# Patient Record
Sex: MALE | Race: BLACK OR AFRICAN AMERICAN | Employment: UNEMPLOYED | URBAN - METROPOLITAN AREA
[De-identification: names, ages, dates, MRNs, and addresses within clinical notes are randomized per-mention and may not be internally consistent; named-entity substitution may affect disease eponyms.]

---
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
## 2020-03-24 ENCOUNTER — OFFICE VISIT (OUTPATIENT)
Dept: NEUROLOGY | Age: 55
End: 2020-03-24

## 2020-03-24 VITALS
HEART RATE: 81 BPM | WEIGHT: 172 LBS | DIASTOLIC BLOOD PRESSURE: 80 MMHG | SYSTOLIC BLOOD PRESSURE: 124 MMHG | BODY MASS INDEX: 25.48 KG/M2 | HEIGHT: 69 IN | OXYGEN SATURATION: 98 %

## 2020-03-24 DIAGNOSIS — G60.9 IDIOPATHIC PERIPHERAL NEUROPATHY: Primary | ICD-10-CM

## 2020-03-24 RX ORDER — SIMETHICONE 125 MG
125 CAPSULE ORAL DAILY
COMMUNITY

## 2020-03-24 RX ORDER — DOXEPIN HYDROCHLORIDE 10 MG/1
CAPSULE ORAL
COMMUNITY
End: 2020-07-07

## 2020-03-24 RX ORDER — HYDROXYZINE 25 MG/1
TABLET, FILM COATED ORAL DAILY
COMMUNITY

## 2020-03-24 RX ORDER — EMTRICITABINE AND TENOFOVIR DISOPROXIL FUMARATE 200; 300 MG/1; MG/1
TABLET, FILM COATED ORAL DAILY
COMMUNITY

## 2020-03-24 RX ORDER — ESCITALOPRAM OXALATE 20 MG/1
20 TABLET ORAL DAILY
COMMUNITY

## 2020-03-24 RX ORDER — BUPROPION HYDROCHLORIDE 300 MG/1
300 TABLET ORAL
COMMUNITY

## 2020-03-24 RX ORDER — DOCUSATE SODIUM 100 MG/1
100 CAPSULE, LIQUID FILLED ORAL DAILY
COMMUNITY
End: 2020-07-07

## 2020-03-24 RX ORDER — METHOCARBAMOL 750 MG/1
TABLET, FILM COATED ORAL DAILY
COMMUNITY

## 2020-03-25 NOTE — PROGRESS NOTES
Hi,    All of you labs are not back yet, but that ones I have so far are normal.    Thanks.    Dr. Odette Bailey

## 2020-04-02 LAB
ACE SERPL-CCNC: 57 U/L (ref 14–82)
ALBUMIN SERPL ELPH-MCNC: 3.9 G/DL (ref 2.9–4.4)
ALBUMIN SERPL-MCNC: 4.3 G/DL (ref 3.8–4.9)
ALBUMIN/GLOB SERPL: 1.4 {RATIO} (ref 0.7–1.7)
ALBUMIN/GLOB SERPL: 1.7 {RATIO} (ref 1.2–2.2)
ALP SERPL-CCNC: 81 IU/L (ref 39–117)
ALPHA1 GLOB SERPL ELPH-MCNC: 0.2 G/DL (ref 0–0.4)
ALPHA2 GLOB SERPL ELPH-MCNC: 0.7 G/DL (ref 0.4–1)
ALT SERPL-CCNC: 14 IU/L (ref 0–44)
AST SERPL-CCNC: 19 IU/L (ref 0–40)
B-GLOBULIN SERPL ELPH-MCNC: 1 G/DL (ref 0.7–1.3)
BASOPHILS # BLD AUTO: 0 X10E3/UL (ref 0–0.2)
BASOPHILS NFR BLD AUTO: 0 %
BILIRUB SERPL-MCNC: 0.3 MG/DL (ref 0–1.2)
BUN SERPL-MCNC: 11 MG/DL (ref 6–24)
BUN/CREAT SERPL: 10 (ref 9–20)
CALCIUM SERPL-MCNC: 10 MG/DL (ref 8.7–10.2)
CENTROMERE B AB SER-ACNC: <0.2 AI (ref 0–0.9)
CHLORIDE SERPL-SCNC: 104 MMOL/L (ref 96–106)
CHROMATIN AB SERPL-ACNC: <0.2 AI (ref 0–0.9)
CO2 SERPL-SCNC: 27 MMOL/L (ref 20–29)
CREAT SERPL-MCNC: 1.12 MG/DL (ref 0.76–1.27)
DSDNA AB SER-ACNC: <1 IU/ML (ref 0–9)
ENA JO1 AB SER-ACNC: <0.2 AI (ref 0–0.9)
ENA RNP AB SER-ACNC: <0.2 AI (ref 0–0.9)
ENA SCL70 AB SER-ACNC: <0.2 AI (ref 0–0.9)
ENA SM AB SER-ACNC: <0.2 AI (ref 0–0.9)
ENA SM+RNP AB SER-ACNC: <0.2 AI (ref 0–0.9)
ENA SS-A AB SER-ACNC: <0.2 AI (ref 0–0.9)
ENA SS-B AB SER-ACNC: <0.2 AI (ref 0–0.9)
EOSINOPHIL # BLD AUTO: 0.2 X10E3/UL (ref 0–0.4)
EOSINOPHIL NFR BLD AUTO: 3 %
ERYTHROCYTE [DISTWIDTH] IN BLOOD BY AUTOMATED COUNT: 12.6 % (ref 11.6–15.4)
GAMMA GLOB SERPL ELPH-MCNC: 1.1 G/DL (ref 0.4–1.8)
GLOBULIN SER CALC-MCNC: 2.6 G/DL (ref 1.5–4.5)
GLOBULIN SER-MCNC: 3 G/DL (ref 2.2–3.9)
GLUCOSE SERPL-MCNC: 82 MG/DL (ref 65–99)
GM1 GANGL IGG TITR SER IA: 9 % (ref 0–30)
HCT VFR BLD AUTO: 45.8 % (ref 37.5–51)
HGB BLD-MCNC: 15.8 G/DL (ref 13–17.7)
IGA SERPL-MCNC: 299 MG/DL (ref 90–386)
IGG SERPL-MCNC: 1159 MG/DL (ref 700–1600)
IGM SERPL-MCNC: 73 MG/DL (ref 20–172)
IMM GRANULOCYTES # BLD AUTO: 0 X10E3/UL (ref 0–0.1)
IMM GRANULOCYTES NFR BLD AUTO: 0 %
INTERPRETATION SERPL IEP-IMP: NORMAL
INTERPRETATION,OLIG1: NORMAL
LYMPHOCYTES # BLD AUTO: 3.4 X10E3/UL (ref 0.7–3.1)
LYMPHOCYTES NFR BLD AUTO: 66 %
M PROTEIN SERPL ELPH-MCNC: NORMAL G/DL
MAG IGM AUTO-AB INTERPRETATION: NORMAL
MAG IGM SER-ACNC: <900 BTU (ref 0–999)
MCH RBC QN AUTO: 34.2 PG (ref 26.6–33)
MCHC RBC AUTO-ENTMCNC: 34.5 G/DL (ref 31.5–35.7)
MCV RBC AUTO: 99 FL (ref 79–97)
MONOCYTES # BLD AUTO: 0.5 X10E3/UL (ref 0.1–0.9)
MONOCYTES NFR BLD AUTO: 9 %
NEUTROPHILS # BLD AUTO: 1.1 X10E3/UL (ref 1.4–7)
NEUTROPHILS NFR BLD AUTO: 22 %
PLATELET # BLD AUTO: 206 X10E3/UL (ref 150–450)
PLEASE NOTE:, 149534: NORMAL
POTASSIUM SERPL-SCNC: 4.6 MMOL/L (ref 3.5–5.2)
PROT SERPL-MCNC: 6.9 G/DL (ref 6–8.5)
RBC # BLD AUTO: 4.62 X10E6/UL (ref 4.14–5.8)
RIBOSOMAL P AB SER-ACNC: <0.2 AI (ref 0–0.9)
SEE BELOW:, 164879: NORMAL
SODIUM SERPL-SCNC: 145 MMOL/L (ref 134–144)
T4 FREE SERPL-MCNC: 1.27 NG/DL (ref 0.82–1.77)
TSH SERPL DL<=0.005 MIU/L-ACNC: 2.75 UIU/ML (ref 0.45–4.5)
VIT B12 SERPL-MCNC: 469 PG/ML (ref 232–1245)
WBC # BLD AUTO: 5.1 X10E3/UL (ref 3.4–10.8)

## 2020-04-14 ENCOUNTER — DOCUMENTATION ONLY (OUTPATIENT)
Dept: NEUROLOGY | Age: 55
End: 2020-04-14

## 2020-06-17 ENCOUNTER — OFFICE VISIT (OUTPATIENT)
Dept: NEUROLOGY | Age: 55
End: 2020-06-17

## 2020-06-17 DIAGNOSIS — G56.01 CARPAL TUNNEL SYNDROME OF RIGHT WRIST: ICD-10-CM

## 2020-06-17 DIAGNOSIS — G12.20 MOTOR NEURON DISEASE (HCC): ICD-10-CM

## 2020-06-17 NOTE — PROCEDURES
ELECTRODIAGNOSTIC REPORT      Test Date:  2020    Patient: Kenyatta Alarcon : 1965 Physician: Dr. Ronnie Du D.O.   ID#: [id number] SEX: Male Ref. Phys:      Patient History / Exam:  This is a pleasant gentleman with progressive weakness in his upper extremity. His examination reveals sig weakness in his distal upper extremities with minimal sensory loss. He continues to have 2/5 strength in the distal right upper extremity and 3/5 on the left. Reflexes were 2+ in biceps/triceps and lower extremities. No pathological reflexes. EMG & NCV Findings:    Nerve conduction studies as listed below were normal for the bilateral ulnar and radial sensory nerves. This bilateral median sensory nerves revealed a prolongation of the peak latency with a reduced amplitude. The left median motor revealed a prolongation of the distal motor latency with a normal amplitude and conduction velocity. The right median motor revealed a prolongation of the distal motor latency with a significantly reduced amplitude. The right ulnar motor was absent. The left ulnar motor revealed a reduced amplitude with a normal conduction velocity. F waves were normal.     Disposable concentric needle examination of the muscles listed below revealed widespread active denervation with neurogenic appearing motor units throughout the right upper extremity. The right upper extremity had active denervation in the FDI only. Decreased recruitment was seen in the triceps and apb. The thoracic paraspinal muscles and mentalis were normal.      Impression: This study was abnormal.  There was electrodiagnostic evidence upon today's examination suggestin. A probable motor neuron disease/motor neuropathy involving the right upper extremity,and to a lesser extent the left upper extremity. A multilevel cervical radiculopathy would also be in the differential given these results.     2. A bilateral distal median sensory motor neuropathy across the wrist, as can be seen in a bilateral carpal tunnel syndrome. This however is not the primary neurological disease present. 3. There is no evidence of a demyelinating neuropathy or myopathy. 4. I discussed with the patient these results and that additional testing is needed. This will include serology and a lumbar puncture. The patient cannot receive a MRI. I placed the appropriate orders today and he will return to clinic immediately after the results are obtained.    ___________________________  Elinor CODY  FAAN    Nerve Conduction Studies  Anti Sensory Summary Table     Stim Site NR Onset (ms) Peak (ms) O-P Amp (µV) Norm Peak (ms) Norm O-P Amp Site1 Site2 Dist (cm) Norm Jose M (m/s)   Left Median Anti Sensory (2nd Digit)  32.9°C   Wrist    4.8 5.7 4.0 <4 >11 Wrist 2nd Digit 14.0    Right Median Anti Sensory (2nd Digit)  32.9°C   Wrist    3.6 4.6 9.9 <4 >11 Wrist 2nd Digit 14.0    Left Radial Anti Sensory (Base 1st Digit)  32.9°C   Wrist    1.6 2.2 48.2 <2.8 7 Wrist Base 1st Digit 10.0    Right Radial Anti Sensory (Base 1st Digit)  32.9°C   Wrist    1.8 2.6 47.0 <2.8 7 Wrist Base 1st Digit 10.0    Left Ulnar Anti Sensory (5th Digit)  32.9°C   Wrist    2.6 3.3 11.5 <4.0 >10 Wrist 5th Digit 14.0    Right Ulnar Anti Sensory (5th Digit)  32.9°C   Wrist    2.7 3.4 10.0 <4.0 >10 Wrist 5th Digit 14.0      Motor Summary Table     Stim Site NR Onset (ms) Norm Onset (ms) O-P Amp (mV) Norm O-P Amp P-T Amp (mV) Site1 Site2 Dist (cm) Jose M (m/s)   Left Median Motor (Abd Poll Brev)  32.9°C   Wrist    5.5 <4.5 6.1 >4.1  Wrist Abd Poll Brev 8.0 15   Elbow    10.1  5.8   Elbow Wrist 25.0 54   Right Median Motor (Abd Poll Brev)  32.9°C   Wrist    4.7 <4.5 0.6 >4.1  Wrist Abd Poll Brev 8.0 17   Elbow NR      Elbow Wrist 28.0    Left Ulnar Motor (Abd Dig Minimi)  32.9°C   Wrist    3.1 <3.1 4.7 >7.0  Wrist Abd Dig Minimi 8.0 26   B Elbow    7.3  4.4   B Elbow Wrist 22.0 52   A Elbow    9.3  4.3   A Elbow B Elbow 10.0 50   Right Ulnar Motor (Abd Dig Minimi)  32.9°C   Wrist NR  <3.1  >7.0  Wrist Abd Dig Minimi 8.0    B Elbow NR      B Elbow Wrist 0.0    A Elbow NR      A Elbow B Elbow 10.0      F Wave Studies     NR F-Lat (ms) Lat Norm (ms) L-R F-Lat (ms) L-R Lat Norm   Left Median (Mrkrs) (Abd Poll Brev)  32.9°C      28.83 <31 1.77 <2.2   Right Median (Mrkrs) (Abd Poll Brev)  32.9°C      27.06 <31 1.77 <2.2       EMG     Side Muscle Nerve Root Ins Act Fibs Psw Recrt Duration Amp Poly Comment   Right Deltoid Axillary C5-6 Incr 2+ 2+ Reduced Nml Nml Nml    Right Triceps Radial C6-7-8 Incr 2+ 1+ Reduced Incr Nml Nml    Right Biceps Musculocut C5-6 Incr 2+ 1+ Reduced Incr Nml Nml    Right 1stDorInt Ulnar C8-T1 Incr 2+ 1+ Reduced Nml Nml 1+    Right Abd Poll Brev Median C8-T1 Incr 2+ 1+ Nml Nml Nml Nml    Left Deltoid Axillary C5-6 Nml Nml Nml Nml Nml Nml Nml    Left Triceps Radial C6-7-8 Nml Nml Nml Reduced Nml Nml Nml    Left Biceps Musculocut C5-6 Nml Nml Nml Nml Nml Nml Nml    Left 1stDorInt Ulnar C8-T1 Incr 2+ 2+ Reduced Nml Nml Nml    Left Abd Poll Brev Median C8-T1 Nml Nml Nml Reduced Nml Incr Nml    Left lLower Thoracic  T10T11 Nml Nml Nml Nml Nml Nml Nml    Left mentalis Facial CN VII Nml Nml Nml Nml Nml Nml Nml      Waveforms:

## 2020-06-22 ENCOUNTER — TELEPHONE (OUTPATIENT)
Dept: NEUROLOGY | Age: 55
End: 2020-06-22

## 2020-06-24 ENCOUNTER — TELEPHONE (OUTPATIENT)
Dept: NEUROLOGY | Age: 55
End: 2020-06-24

## 2020-06-26 ENCOUNTER — DOCUMENTATION ONLY (OUTPATIENT)
Dept: NEUROLOGY | Age: 55
End: 2020-06-26

## 2020-06-29 ENCOUNTER — TELEPHONE (OUTPATIENT)
Dept: NEUROLOGY | Age: 55
End: 2020-06-29

## 2020-07-05 DIAGNOSIS — G12.20 MOTOR NEURON DISEASE (HCC): Primary | ICD-10-CM

## 2020-07-07 NOTE — PROGRESS NOTES
PT pre call done. PT aware of need to hold anticoagulants per protocol. PT aware of need for  at discharge. PT aware of arrival time pre procedure, 0745 for head CT at 0800. Pt states no questions at this time. His LP is for 1000.

## 2020-07-15 ENCOUNTER — HOSPITAL ENCOUNTER (OUTPATIENT)
Dept: CT IMAGING | Age: 55
Discharge: HOME | End: 2020-07-15
Payer: MEDICAID

## 2020-07-15 ENCOUNTER — HOSPITAL ENCOUNTER (OUTPATIENT)
Dept: GENERAL RADIOLOGY | Age: 55
Discharge: HOME | End: 2020-07-15
Attending: RADIOLOGY | Admitting: RADIOLOGY
Payer: MEDICAID

## 2020-07-15 VITALS
SYSTOLIC BLOOD PRESSURE: 100 MMHG | DIASTOLIC BLOOD PRESSURE: 69 MMHG | OXYGEN SATURATION: 100 % | TEMPERATURE: 97.7 F | RESPIRATION RATE: 20 BRPM | HEIGHT: 69 IN | BODY MASS INDEX: 27.22 KG/M2 | WEIGHT: 183.8 LBS | HEART RATE: 68 BPM

## 2020-07-15 DIAGNOSIS — G12.20 MOTOR NEURON DISEASE (HCC): ICD-10-CM

## 2020-07-15 LAB
ALBUMIN SERPL-MCNC: 4.1 G/DL (ref 3.4–5)
ALBUMIN/GLOB SERPL: 1.3 {RATIO} (ref 0.8–1.7)
ALP SERPL-CCNC: 88 U/L (ref 45–117)
ALT SERPL-CCNC: 23 U/L (ref 16–61)
ANION GAP SERPL CALC-SCNC: 3 MMOL/L (ref 3–18)
APPEARANCE CSF: ABNORMAL
AST SERPL-CCNC: 17 U/L (ref 10–38)
BASOPHILS # BLD: 0 K/UL (ref 0–0.1)
BASOPHILS NFR BLD: 0 % (ref 0–2)
BILIRUB SERPL-MCNC: 1.3 MG/DL (ref 0.2–1)
BUN SERPL-MCNC: 13 MG/DL (ref 7–18)
BUN/CREAT SERPL: 12 (ref 12–20)
CALCIUM SERPL-MCNC: 9.3 MG/DL (ref 8.5–10.1)
CHLORIDE SERPL-SCNC: 105 MMOL/L (ref 100–111)
CO2 SERPL-SCNC: 32 MMOL/L (ref 21–32)
COLOR CSF: ABNORMAL
CREAT SERPL-MCNC: 1.13 MG/DL (ref 0.6–1.3)
DIFFERENTIAL METHOD BLD: ABNORMAL
EOSINOPHIL # BLD: 0.1 K/UL (ref 0–0.4)
EOSINOPHIL NFR BLD: 1 % (ref 0–5)
ERYTHROCYTE [DISTWIDTH] IN BLOOD BY AUTOMATED COUNT: 12.9 % (ref 11.6–14.5)
GLOBULIN SER CALC-MCNC: 3.2 G/DL (ref 2–4)
GLUCOSE CSF-MCNC: 54 MG/DL (ref 40–70)
GLUCOSE SERPL-MCNC: 120 MG/DL (ref 74–99)
HCT VFR BLD AUTO: 47.9 % (ref 36–48)
HGB BLD-MCNC: 15.9 G/DL (ref 13–16)
LYMPHOCYTES # BLD: 3.1 K/UL (ref 0.9–3.6)
LYMPHOCYTES NFR BLD: 49 % (ref 21–52)
MCH RBC QN AUTO: 33.8 PG (ref 24–34)
MCHC RBC AUTO-ENTMCNC: 33.2 G/DL (ref 31–37)
MCV RBC AUTO: 101.7 FL (ref 74–97)
MONOCYTES # BLD: 0.5 K/UL (ref 0.05–1.2)
MONOCYTES NFR BLD: 9 % (ref 3–10)
NEUTS SEG # BLD: 2.6 K/UL (ref 1.8–8)
NEUTS SEG NFR BLD: 41 % (ref 40–73)
PLATELET # BLD AUTO: 173 K/UL (ref 135–420)
PMV BLD AUTO: 11.2 FL (ref 9.2–11.8)
POTASSIUM SERPL-SCNC: 4.6 MMOL/L (ref 3.5–5.5)
PROT CSF-MCNC: 74 MG/DL (ref 15–45)
PROT SERPL-MCNC: 7.3 G/DL (ref 6.4–8.2)
RBC # BLD AUTO: 4.71 M/UL (ref 4.7–5.5)
RBC # CSF: 7175 /CU MM
SODIUM SERPL-SCNC: 140 MMOL/L (ref 136–145)
T4 FREE SERPL-MCNC: 1.3 NG/DL (ref 0.7–1.5)
TSH SERPL DL<=0.05 MIU/L-ACNC: 0.94 UIU/ML (ref 0.36–3.74)
TUBE # CSF: 1
TUBE # CSF: 2
TUBE # CSF: 4
VIT B12 SERPL-MCNC: 380 PG/ML (ref 211–911)
WBC # BLD AUTO: 6.3 K/UL (ref 4.6–13.2)
WBC # CSF: 9 /CU MM

## 2020-07-15 PROCEDURE — 89050 BODY FLUID CELL COUNT: CPT

## 2020-07-15 PROCEDURE — 82607 VITAMIN B-12: CPT

## 2020-07-15 PROCEDURE — 82945 GLUCOSE OTHER FLUID: CPT

## 2020-07-15 PROCEDURE — 84439 ASSAY OF FREE THYROXINE: CPT

## 2020-07-15 PROCEDURE — 36415 COLL VENOUS BLD VENIPUNCTURE: CPT

## 2020-07-15 PROCEDURE — 85025 COMPLETE CBC W/AUTO DIFF WBC: CPT

## 2020-07-15 PROCEDURE — 84443 ASSAY THYROID STIM HORMONE: CPT

## 2020-07-15 PROCEDURE — 82784 ASSAY IGA/IGD/IGG/IGM EACH: CPT

## 2020-07-15 PROCEDURE — 62270 DX LMBR SPI PNXR: CPT

## 2020-07-15 PROCEDURE — 86592 SYPHILIS TEST NON-TREP QUAL: CPT

## 2020-07-15 PROCEDURE — 84157 ASSAY OF PROTEIN OTHER: CPT

## 2020-07-15 PROCEDURE — 70450 CT HEAD/BRAIN W/O DYE: CPT

## 2020-07-15 PROCEDURE — 82164 ANGIOTENSIN I ENZYME TEST: CPT

## 2020-07-15 PROCEDURE — 80053 COMPREHEN METABOLIC PANEL: CPT

## 2020-07-15 PROCEDURE — 86617 LYME DISEASE ANTIBODY: CPT

## 2020-07-15 PROCEDURE — 83520 IMMUNOASSAY QUANT NOS NONAB: CPT

## 2020-07-15 RX ORDER — ACETAMINOPHEN 500 MG
1000 TABLET ORAL
COMMUNITY

## 2020-07-15 RX ORDER — LIDOCAINE HYDROCHLORIDE 10 MG/ML
1-10 INJECTION, SOLUTION EPIDURAL; INFILTRATION; INTRACAUDAL; PERINEURAL
Status: COMPLETED | OUTPATIENT
Start: 2020-07-15 | End: 2020-07-15

## 2020-07-15 RX ORDER — PANTOPRAZOLE SODIUM 40 MG/1
40 TABLET, DELAYED RELEASE ORAL DAILY
COMMUNITY

## 2020-07-15 RX ORDER — LIDOCAINE HYDROCHLORIDE 10 MG/ML
INJECTION, SOLUTION EPIDURAL; INFILTRATION; INTRACAUDAL; PERINEURAL
Status: DISCONTINUED
Start: 2020-07-15 | End: 2020-07-15 | Stop reason: HOSPADM

## 2020-07-15 RX ORDER — HYDROCODONE BITARTRATE AND ACETAMINOPHEN 5; 325 MG/1; MG/1
1 TABLET ORAL
Status: DISCONTINUED | OUTPATIENT
Start: 2020-07-15 | End: 2020-07-15 | Stop reason: HOSPADM

## 2020-07-15 RX ADMIN — LIDOCAINE HYDROCHLORIDE 2 ML: 10 INJECTION, SOLUTION EPIDURAL; INFILTRATION; INTRACAUDAL; PERINEURAL at 10:57

## 2020-07-15 NOTE — H&P
The patient is an appropriate candidate to undergo LP. Patient assessed immediately prior to induction. Anesthesia plan as follows: Local/No Anesthesia. History and Physical update:  H&P was reviewed and the patient was examined. No changes have occurred in the patient's condition since the H&P was completed.     Bobby Jones MD  Vascular & Interventional Radiology  [facility]  7/15/2020

## 2020-07-15 NOTE — PROCEDURES
Vascular & Interventional Radiology Brief Procedure Note    Interventional Radiologist: Rosaline Leon MD    Pre-operative Diagnosis:  Upper ext weakness    Post-operative Diagnosis: Same as pre-op dx    Procedure(s) Performed:  LP    Anesthesia:  Local and Moderate Sedation    Findings:  Opening pressure 14cm H2O.  13ccs of clear blood tinged CSF.       Complications: None    Estimated Blood Loss:  minimal    Tubes and Drains: None    Specimens: sent    Condition: Good       Rosaline Leon MD  [address] [place] [address],[facility]  Vascular & Interventional Radiology  7/15/2020

## 2020-07-15 NOTE — PROGRESS NOTES
Pt is all prepped and ready for procedure. 1037 Pt back to care unit. Awake and alert and tolerated procedure well. Puncture site to back dry and intact. 7000 Discharge instructions reviewed with pt and family and they verbalized all understandings. [phone] Pt escorted to car and left with family in stable condition.

## 2020-07-15 NOTE — DISCHARGE INSTRUCTIONS
Patient Education     Lumbar Puncture: After Your Visit  Your Care Instructions  A lumbar puncture (also called a spinal tap) is a test to check the fluid that surrounds and protects your spinal cord and brain. Your doctor may have done this test to look for an infection. In some cases, a lumbar puncture is done to release pressure from too much fluid or to look for diseases such as multiple sclerosis. The fluid that was taken is often sent to a lab for different tests. Your doctor may get some answers right away, but other answers take hours to days. Your doctor will call you with the results. You may feel tired or have a mild backache or a headache after the test. Some people have trouble sleeping for 1 or 2 days. Follow-up care is a key part of your treatment and safety. Be sure to make and go to all appointments, and call your doctor if you are having problems. Its also a good idea to know your test results and keep a list of the medicines you take. How can you care for yourself at home? · Drink plenty of liquids in the next few hours. This may prevent a headache or keep a headache from being severe. · Your doctor may tell you to lie flat in bed for 1 to 4 hours. This may prevent a headache. · Get plenty of rest.  · If your doctor prescribed antibiotics, take them as directed. Do not stop taking them just because you feel better. You need to take the full course of antibiotics. · Take anti-inflammatory medicines to reduce a headache or backache. These include ibuprofen (Advil, Motrin) and naproxen (Aleve). Read and follow all instructions on the label. When should you call for help? Call your doctor now or seek immediate medical care if:  · You have a fever with a stiff neck or a severe headache. · You have any drainage or bleeding from the site of the puncture. · You feel numb or lose strength below the puncture site.   Watch closely for changes in your health, and be sure to contact your doctor if:  · You do not get better as expected. Where can you learn more? Go to Me[employer]be  Enter B775 in the search box to learn more about \"Lumbar Puncture: After Your Visit. \"   © 7344-5357 [employer]wise, Incorporated. Care instructions adapted under license by [facility] [employer] (which disclaims liability or warranty for this information). This care instruction is for use with your licensed healthcare professional. If you have questions about a medical condition or this instruction, always ask your healthcare professional. [place] [address] any warranty or liability for your use of this information. Content Version: 7.0.844356; Last Revised: September 13, 2011                 DISCHARGE SUMMARY from Nurse    PATIENT INSTRUCTIONS:    After general anesthesia or intravenous sedation, for 24 hours or while taking prescription Narcotics:  · Limit your activities  · Do not drive and operate hazardous machinery  · Do not make important personal or business decisions  · Do  not drink alcoholic beverages  · If you have not urinated within 8 hours after discharge, please contact your surgeon on call. Report the following to your surgeon:  · Excessive pain, swelling, redness or odor of or around the surgical area  · Temperature over 100.5  · Nausea and vomiting lasting longer than 4 hours or if unable to take medications  · Any signs of decreased circulation or nerve impairment to extremity: change in color, persistent  numbness, tingling, coldness or increase pain  · Any questions    What to do at Home:  Recommended activity: Activity as tolerated,        *  Please give a list of your current medications to your Primary Care Provider. *  Please update this list whenever your medications are discontinued, doses are      changed, or new medications (including over-the-counter products) are added.     *  Please carry medication information at all times in case of emergency situations. These are general instructions for a healthy lifestyle:    No smoking/ No tobacco products/ Avoid exposure to second hand smoke  Surgeon General's Warning:  Quitting smoking now greatly reduces serious risk to your health. Obesity, smoking, and sedentary lifestyle greatly increases your risk for illness    A healthy diet, regular physical exercise & weight monitoring are important for maintaining a healthy lifestyle    You may be retaining fluid if you have a history of heart failure or if you experience any of the following symptoms:  Weight gain of 3 pounds or more overnight or 5 pounds in a week, increased swelling in our hands or feet or shortness of breath while lying flat in bed. Please call your doctor as soon as you notice any of these symptoms; do not wait until your next office visit. The discharge information has been reviewed with the patient and caregiver. The patient and caregiver verbalized understanding. Discharge medications reviewed with the patient and caregiver and appropriate educational materials and side effects teaching were provided.     Patient armband removed and shredded    ___________________________________________________________________________________________________________________________________

## 2020-07-16 LAB
ACE SERPL-CCNC: 5 U/L (ref 14–82)
ANGIO CONVERTING ENZ, CSF: <1.5 U/L (ref 0–3.1)
MAG AB, IGM, ANMGLT: <900 BTU (ref 0–999)
MAG IGM AUTO-AB INTERPRETATION: NORMAL
REAGIN AB CSF QL: NON REACTIVE

## 2020-07-17 LAB
IGA SERPL-MCNC: 209 MG/DL (ref 90–386)
IGG SERPL-MCNC: 1023 MG/DL (ref 603–1613)
IGM SERPL-MCNC: 108 MG/DL (ref 20–172)
PROT PATTERN SERPL IFE-IMP: NORMAL

## 2020-07-17 NOTE — PROGRESS NOTES
Neurology Note    Patient ID:  Sonny Rousseau  [id number]  2 y.o.  1965      Date of Consultation:  July 21, 2020    Referring Physician: Dr. Satya Estes    Reason for Consultation:  weakness    Subjective: I am still weak       History of Present Illness:   Sonny Rousseau is a 48 y.o. male who returns to the neurology clinic at [facility] for an evaluation of his upper extremity weakness. I did last see the patient approximately 1 month ago and he did have his EMG/nerve conduction study performed. 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 A multiple level cervical radiculopathy could also give similar results. There was a mild bilateral carpal tunnel syndrome but this was not his primary abnormality. After that visit, he did have a lumbar puncture performed. It was a traumatic tap with over 7000 red blood cells. This accounted for the slightly elevated white blood cell count and the protein level. Anti-mag antibody from serum was negative. ACE level was negative    New low back pain. Gets sore intermittently. He has also noticed that he is getting a bit more of a sore throat. Swallowing is okay occasionally he feels that it is getting stuck. He tolerated the lumbar puncture without any difficulty. He has not noticed any new weakness in his hands.       Past Medical History:   Diagnosis Date    Arrhythmia     Depression     GERD (gastroesophageal reflux disease)     Ill-defined condition     possible [facility] dx    Neurological disorder         Past Surgical History:   Procedure Laterality Date    HX APPENDECTOMY      NEUROLOGICAL PROCEDURE UNLISTED      rods/screws in neck        Family History   Problem Relation Age of Onset    No Known Problems Mother         Social History     Tobacco Use    Smoking status: Current Every Day Smoker     Packs/day: 0.50    Smokeless tobacco: Never Used   Substance Use Topics    Alcohol use: Not Currently        No Known Allergies     Prior to Admission medications    Medication Sig Start Date End Date Taking? Authorizing Provider   gabapentin (NEURONTIN) 300 mg capsule Take 1 Cap by mouth three (3) times daily. Max Daily Amount: 900 mg. 7/21/20  Yes Gene Lyles DO   pantoprazole (Protonix) 40 mg tablet Take 40 mg by mouth daily. Yes Provider, Historical   acetaminophen (Tylenol Extra Strength) 500 mg tablet Take 1,000 mg by mouth every six (6) hours as needed for Pain. Yes Provider, Historical   buPROPion XL (WELLBUTRIN XL) 300 mg XL tablet Take 300 mg by mouth every morning. Yes Provider, Historical   escitalopram oxalate (LEXAPRO) 20 mg tablet Take 20 mg by mouth daily. Yes Provider, Historical   simethicone (GAS-X) 125 mg capsule Take 125 mg by mouth daily. Yes Provider, Historical   emtricitabine-tenofovir, TDF, (Truvada) 200-300 mg per tablet Take  by mouth daily. Yes Provider, Historical   hydrOXYzine HCL (ATARAX) 25 mg tablet Take  by mouth daily. Yes Provider, Historical   methocarbamoL (ROBAXIN) 750 mg tablet Take  by mouth daily.    Yes Provider, Historical       Review of Systems:    General, constitutional: negative  Eyes, vision: negative  Ears, nose, throat: negative  Cardiovascular, heart: negative  Respiratory: negative  Gastrointestinal: negative  Genitourinary: negative  Musculoskeletal: negative  Skin and integumentary: negative  Psychiatric: negative  Endocrine: negative  Neurological: negative, except for HPI  Hematologic/lymphatic: negative  Allergy/immunology: negative    Objective:     Visit Vitals  /80   Pulse 78   Ht 5' 9\" (1.753 m)   SpO2 98%   BMI 27.14 kg/m²       Physical Exam:    General:  appears well nourished in no acute distress  Neck: no carotid bruits  Lungs: clear to auscultation  Heart:  no murmurs, regular rate  Lower extremity: peripheral pulses palpable and no edema  Skin: intact    Neurological exam:    Awake, alert, oriented to person, place and time  Recent and remote memory were normal  Attention and concentration were intact  Language was intact. There was no aphasia  Speech: no dysarthria  Fund of knowledge was preserved    Cranial nerves:   II-XII were tested    Perrrla  Visual fields were full  Eomi, no evidence of nystagmus  Facial sensation:  normal and symmetric  Facial motor: normal and symmetric  Hearing intact  SCM strength intact  Tongue: midline without fasciculations    Motor: Tone normal    No evidence of fasciculations    Strength testing:   deltoid triceps biceps Wrist ext. Wrist flex. intrinsics Hip flex. Hip ext. Knee ext. Knee flex Dorsi flex Plantar flex   Right 5 5 5 4 4 2 5 5 5 5 5 5   Left 5 5 5 4 4 3 5 5 5 5 5 5         Sensory:  Upper extremity: intact to pp, light touch, and vibration  Lower extremity: intact to pp, light touch, and vibration    Reflexes:    Right Left  Biceps  2 2  Triceps 1 1  Brachiorad. 2 2  Patella  2 2  Achilles 2 2    There are no pathologic reflexes    Plantar response:  flexor bilaterally    Cerebellar testing:  no tremor apparent, finger/nose and hannah were intact    Romberg: absent    Gait: steady. Heel, toe, and tandem gait were normal    Labs:     Lab Results   Component Value Date/Time    Sodium 140 07/15/2020 11:40 AM    Potassium 4.6 07/15/2020 11:40 AM    Chloride 105 07/15/2020 11:40 AM    Glucose 120 (H) 07/15/2020 11:40 AM    BUN 13 07/15/2020 11:40 AM    Creatinine 1.13 07/15/2020 11:40 AM    Calcium 9.3 07/15/2020 11:40 AM    WBC 6.3 07/15/2020 11:40 AM    HCT 47.9 07/15/2020 11:40 AM    HGB 15.9 07/15/2020 11:40 AM    PLATELET 346 54/31/6672 11:40 AM       Imaging:    No results found for this or any previous visit. Results from [place] encounter on 07/15/20   CT HEAD WO CONT    Narrative EXAM: CT HEAD WITHOUT CONTRAST    CLINICAL INDICATION/HISTORY: asymmetric weakness.   needs before lumbar puncture  -Additional: There is reports asymmetric weakness beginning April 2019, history  of cervical spine surgery for disc disease related to spinal stenosis,  progression of weakness in both hands, question ALS. COMPARISON: None    TECHNIQUE: Serial axial images of the head were performed without intravenous  contrast. Dose reduction techniques:  Automated exposure control, mAs and/or kVp  reductions based on patient size, and iterative reconstruction. The specific  techniques utilized on this CT exam have been documented in the patient's  electronic medical record. Digital Imaging and Communications in Medicine  (DICOM) format image data are available to nonaffiliated external healthcare  facilities or entities on a secure, media free, reciprocally searchable basis  with patient authorization for at least a 12-month period after this study. _______________    FINDINGS:    BRAIN:     > Intraparenchymal hemorrhage: None.     > Mass effect/edema: None.     > Infarct/encephalomalacia: No evidence of acute cortical ischemia.    > White matter: Unremarkable.     > Brain volume: Normal for age. EXTRA-AXIAL SPACES:     > No extra-axial hemorrhage.     > No hydrocephalus. SINUSES/MASTOIDS: Clear. CALVARIA: Intact. OTHER: Asymmetric soft tissue near the vertex (sagittal image 19), possible soft  tissue contusion. _______________      Impression IMPRESSION:    No acute intracranial findings. Please note MRI would be best able evaluate if  there is concern for ALS. Assessment and Plan:    The patient is a pleasant 61-year-old gentleman who has severe weakness in his hands bilaterally right greater than left. His neurological examination does reveal atrophy and weakness in his bilateral upper extremities right greater than left. Bilateral hand weakness:  His EMG reveals a motor neuropathy versus cervical radiculopathy in the low cervical level.   He did have a prior cervical spine surgery. All of his laboratory results from his spinal tap are not returned yet we are still waiting for those results. I did discuss this with him. He does have profound hand weakness which does impact his ability to have meaningful hand functioning. I discussed with him the differential of being residual from his cervical spine surgery and just the wallerian degeneration of the nerves versus a focal motor motor neuronopathy, the beginnings of a widespread motor neuronopathy,  or an autoimmune motor nerve disease which are we are waiting for those results. Given the neuropathic pain that he is having, I will start him back on gabapentin 600 mg 3 times a day. Side effects of the medication were reviewed with him in detail. Pending the results of the CSF, additional testing may need to be considered. All of his questions were answered fully today. There is no problem list on file for this patient. The patient should return to clinic in 3 months    Renewed medication: yes.  Side effects reviewed                 Signed By:  Nigel Alpers, DO FAAN    July 21, 2020

## 2020-07-21 ENCOUNTER — OFFICE VISIT (OUTPATIENT)
Dept: NEUROLOGY | Age: 55
End: 2020-07-21

## 2020-07-21 VITALS
DIASTOLIC BLOOD PRESSURE: 80 MMHG | HEIGHT: 69 IN | OXYGEN SATURATION: 98 % | HEART RATE: 78 BPM | SYSTOLIC BLOOD PRESSURE: 128 MMHG | BODY MASS INDEX: 27.14 KG/M2

## 2020-07-21 DIAGNOSIS — G60.9 IDIOPATHIC PERIPHERAL NEUROPATHY: Primary | ICD-10-CM

## 2020-07-21 RX ORDER — GABAPENTIN 300 MG/1
300 CAPSULE ORAL 3 TIMES DAILY
Qty: 90 CAP | Refills: 5 | Status: SHIPPED | OUTPATIENT
Start: 2020-07-21 | End: 2021-07-03

## 2020-07-21 NOTE — LETTER
7/21/2020 12:29 PM 
 
Mr. Elyse Herrera [address] [address] This patient was seen in our office today with Dr. Frannie Rivera Sincerely, Gene Lyles, DO

## 2020-07-22 LAB
B BURGDOR IGG PATRN CSF IB-IMP: NEGATIVE
B BURGDOR IGM PATRN CSF IB-IMP: NEGATIVE
B BURGDOR18KD IGG CSF QL IB: PRESENT
B BURGDOR23KD IGG CSF QL IB: ABNORMAL
B BURGDOR23KD IGM CSF QL IB: ABNORMAL
B BURGDOR28KD IGG CSF QL IB: ABNORMAL
B BURGDOR30KD IGG CSF QL IB: ABNORMAL
B BURGDOR39KD IGG CSF QL IB: ABNORMAL
B BURGDOR39KD IGM CSF QL IB: ABNORMAL
B BURGDOR41KD IGG CSF QL IB: ABNORMAL
B BURGDOR41KD IGM CSF QL IB: ABNORMAL
B BURGDOR45KD IGG CSF QL IB: ABNORMAL
B BURGDOR58KD IGG CSF QL IB: ABNORMAL
B BURGDOR66KD IGG CSF QL IB: ABNORMAL
B BURGDOR93KD IGG CSF QL IB: ABNORMAL

## 2020-07-23 LAB
GM1 IGG AUTOANTIBODIES, GM1GAT: 9 % (ref 0–30)
INTERPRETATION, RGM1GT: NORMAL

## 2020-08-13 ENCOUNTER — TELEPHONE (OUTPATIENT)
Dept: NEUROLOGY | Age: 55
End: 2020-08-13

## 2020-08-17 ENCOUNTER — VIRTUAL VISIT (OUTPATIENT)
Dept: NEUROLOGY | Age: 55
End: 2020-08-17
Payer: MEDICAID

## 2020-08-17 DIAGNOSIS — G12.20 MOTOR NEURON DISEASE (HCC): Primary | ICD-10-CM

## 2020-08-17 PROCEDURE — 99214 OFFICE O/P EST MOD 30 MIN: CPT | Performed by: PSYCHIATRY & NEUROLOGY
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
## 2020-08-17 NOTE — PATIENT INSTRUCTIONS

## 2020-12-11 ENCOUNTER — OFFICE VISIT (OUTPATIENT)
Dept: NEUROLOGY | Age: 55
End: 2020-12-11
Payer: MEDICAID

## 2020-12-11 DIAGNOSIS — G56.03 BILATERAL CARPAL TUNNEL SYNDROME: ICD-10-CM

## 2020-12-11 DIAGNOSIS — M54.12 CERVICAL RADICULOPATHY: ICD-10-CM

## 2020-12-11 PROCEDURE — 95887 MUSC TST DONE W/N TST NONEXT: CPT | Performed by: PSYCHIATRY & NEUROLOGY

## 2020-12-11 PROCEDURE — 95885 MUSC TST DONE W/NERV TST LIM: CPT | Performed by: PSYCHIATRY & NEUROLOGY

## 2020-12-11 PROCEDURE — 95886 MUSC TEST DONE W/N TEST COMP: CPT | Performed by: PSYCHIATRY & NEUROLOGY

## 2020-12-11 PROCEDURE — 95913 NRV CNDJ TEST 13/> STUDIES: CPT | Performed by: PSYCHIATRY & NEUROLOGY

## 2020-12-11 NOTE — PROCEDURES
ELECTRODIAGNOSTIC REPORT      Test Date:  2020    Patient: Chris Bautista : 1965 Physician: Dr. Naeem Butt   ID#: [id number] SEX: Male Ref. Phys: Dr. Naeem Butt     Patient History / Exam:  this is a pleasant 79-year-old gentleman who presents for a follow-up electrodiagnostic study due to progressive hand weakness. His examination today reveals 5 out of 5 strength in his deltoid, biceps, and triceps. His wrist extensors were 5 out of 5, wrist flexors 4 out of 5. Hand intrinsics were 2 out of 5, worse on the right than left. Reflexes were 2 in the biceps and 1 triceps and brachioradialis. EMG & NCV Findings:  Nerve conduction studies as listed below were normal for the right superficial fibular sensory, sural sensory, right radial sensory, left radial sensory, left ulnar sensory, and right ulnar sensory. The left median sensory was absent. The right median sensory revealed a mild prolongation of the peak latency with a slightly reduced amplitude    The right fibular motor was normal.  The left median motor revealed a mild prolongation of the distal motor latency with a normal amplitude. The right median motor revealed a mild prolongation of the peak latency with a markedly reduced amplitude throughout and slowing of the conduction velocity. The right tibial motor was normal.  The left ulnar motor revealed a reduced amplitude throughout. The right ulnar motor nerve conduction study was absent    Disposable concentric needle examination of the bilateral upper extremities revealed active denervation in the left FDI and APB. He was also noted to a mild degree in the right deltoid, triceps, and biceps. There was neurogenic appearing motor units throughout both upper extremities with large amplitude decreased recruitment and polyphasia. The thoracic paraspinal musculature and right lower extremity were normal.    Impression:     This study was abnormal.  There is electrodiagnostic evidence upon today's examination suggestin. A probable multilevel bilateral cervical radiculopathy given the decrease in active denervation from the prior study 6 months ago with more chronic neurogenic appearing motor units. Given the changes over the last 6 months, it would be now less likely to be a motor neuron disease. There was no progression in the denervation from the prior study. 2.  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 This again however is not a primary neurological disease present. 3.  There is no evidence of a demyelinating neuropathy or a myopathy. I discussed these results at length with the patient today. I will prescribe gabapentin 600 mg 3 times a day to help with his neuropathic pain he will continue to follow very closely in neurology clinic. A follow-up study may need to be considered pending his progression. ___________________________  Angie CODY   FAAN        Nerve Conduction Studies  Anti Sensory Summary Table     Stim Site NR Onset (ms) Peak (ms) O-P Amp (µV) Norm Peak (ms) Norm O-P Amp Site1 Site2 Dist (cm) Norm Jose M (m/s)   Left Median Anti Sensory (2nd Digit)  34.6°C   Wrist NR    <4 >11 Wrist 2nd Digit 14.0    Right Median Anti Sensory (2nd Digit)  34.6°C   Wrist    3.4 4.1 9.4 <4 >11 Wrist 2nd Digit 14.0    Left Radial Anti Sensory (Base 1st Digit)  34.6°C   Wrist    1.3 1.8 53.2 <2.8 7 Wrist Base 1st Digit 10.0    Right Radial Anti Sensory (Base 1st Digit)  34.6°C   Wrist    1.5 2.0 31.5 <2.8 7 Wrist Base 1st Digit 10.0    Right Sup Fibular Anti Sensory (Lat ankle)  34.6°C   Lower leg    2.1 2.8 10.6 <4.4 >5.0 Lower leg Lat ankle 10.0 >32   Right Sural Anti Sensory (Lat Mall)  34.6°C   Calf    1.6 2.3 32.5 <4.5 >4.0 Calf Lat Mall 14.0    Left Ulnar Anti Sensory (5th Digit)  34.6°C   Wrist    2.1 2.8 13.2 <4.0 >5 Wrist 5th Digit 14.0    Right Ulnar Anti Sensory (5th Digit) 34.6°C   Wrist    2.9 3.3 9.7 <4.0 >5 Wrist 5th Digit 14.0      Motor Summary Table     Stim Site NR Onset (ms) Norm Onset (ms) O-P Amp (mV) Norm O-P Amp P-T Amp (mV) Site1 Site2 Dist (cm) Jose M (m/s)   Right Fibular Motor (Ext Dig Brev)  34.6°C   Ankle    3.2 <6.5 3.5 >2.6  Ankle Ext Dig Brev 8.0 25   B Fib    10.5  2.8   B Fib Ankle 34.0 47   Poplt    12.5  2.8   Poplt B Fib 10.0 50   Left Median Motor (Abd Poll Brev)  34.6°C   Wrist    5.5 <4.5 6.3 >4.1  Wrist Abd Poll Brev 8.0 15   Elbow    10.0  5.5   Elbow Wrist 24.0 53   Right Median Motor (Abd Poll Brev)  34.6°C   Wrist    5.2 <4.5 0.7 >4.1  Wrist Abd Poll Brev 25.0 48   Elbow    13.2  0.4   Elbow Wrist 25.0 34   Right Tibial Motor (Abd Engel Brev)  34.6°C   Ankle    5.4 <6.1 7.0 >5.3  Ankle Abd Engel Brev 8.0 15   Knee    13.8  5.6   Knee Ankle 41.0 49   Left Ulnar Motor (Abd Dig Minimi)  34.6°C   Wrist    2.7 <3.1 5.4 >7.0  Wrist Abd Dig Minimi 8.0    B Elbow    6.4  4.6   B Elbow Wrist 24.0 65   A Elbow    8.1  4.6   A Elbow B Elbow 10.0 59   Right Ulnar Motor (Abd Dig Minimi)  34.6°C   Wrist NR  <3.1  >7.0  Wrist Abd Dig Minimi 8.0    B Elbow NR      B Elbow Wrist 0.0    A Elbow NR      A Elbow B Elbow 10.0        EMG     Side Muscle Nerve Root Ins Act Fibs Psw Recrt Duration Amp Poly Comment   Left Deltoid Axillary C5-6 Nml Nml Nml Reduced Nml Nml Nml    Left Triceps Radial C6-7-8 Nml Nml Nml Reduced Nml Incr Nml    Left Biceps Musculocut C5-6 Nml Nml Nml Reduced Nml Incr Nml    Left PronatorTeres Median C6-7 Nml Nml Nml Increased Nml Incr Nml    Left 1stDorInt Ulnar C8-T1 Incr 2+ 2+ Reduced Nml Incr 1+    Left Abd Poll Brev Median C8-T1 Incr 1+ 1+ Reduced Nml Incr Nml    Right AntTibialis Dp Br Peron L4-5 Nml Nml Nml Nml Nml Nml Nml    Right VastusLat Femoral L2-4 Nml Nml Nml Nml Nml Nml Nml    Right Deltoid Axillary C5-6 Incr 2+ 1+ Reduced Nml Nml Nml    Right Triceps Radial C6-7-8 Incr 2+ 1+ Reduced Nml Incr Nml    Right Biceps Musculocut C5-6 Incr Nml Nml Reduced Nml Incr Nml    Right PronatorTeres Median C6-7 Nml Nml Nml Reduced Nml Incr Nml    Right 1stDorInt Ulnar C8-T1 Incr Nml Nml Reduced Nml Incr Nml    Right lLower Thoracic  T10T11 Nml Nml Nml Nml Nml Nml Nml      Waveforms:

## 2021-01-05 ENCOUNTER — TELEPHONE (OUTPATIENT)
Dept: NEUROLOGY | Age: 56
End: 2021-01-05

## 2021-01-05 DIAGNOSIS — G60.9 IDIOPATHIC PERIPHERAL NEUROPATHY: ICD-10-CM

## 2021-01-05 RX ORDER — GABAPENTIN 600 MG/1
600 TABLET ORAL 3 TIMES DAILY
Qty: 90 TAB | Refills: 5 | Status: SHIPPED | OUTPATIENT
Start: 2021-01-05 | End: 2021-05-05

## 2021-02-17 DIAGNOSIS — G12.20 MOTOR NEURON DISEASE (HCC): ICD-10-CM

## 2021-05-04 NOTE — PROGRESS NOTES
Neurology Note    Patient ID:  Ana Flores  [id number]  7 y.o.  1965      Date of Consultation:  May 5, 2021      This is a telemedicine visit that was performed with in the originating site at patient's home and the distance site at [facility] outpatient clinic at [facility].  This telemedicine visit utilized synchronous (real-time) audio-video technology. Verbal consent to participate in the video visit was obtained. This visit occurred during the corona (COVID -19) public health emergency. I discussed with the patient the nature of our telemedicine visit, that:  - I would evaluate the patient and recommend diagnostic and treatment based on my assessment  - Our sessions are not being recorded and that personal health information is protected  - Our team will provide follow-up care in person if and when the patient needs it. Consent:  The patient is aware that this patient-initiated Telehealth encounter is a billable service, with coverage as determined by the patient's insurance carrier. The patient is aware that they may receive a bill and has provided verbal consent to proceed:     Subjective: I am still weak       History of Present Illness:   Ana Flores is a 50 y.o. male who returns to the neurology clinic at [facility] for an urgent evaluation. The patient was last seen in the clinic on August 17, 2020. Please see my history of present illness, examination, and treatment base plan from that day. He had a history of severe cervical spine disease and had an anterior cervical discectomy with fusion for severe bilateral neuroforaminal stenosis based on CT and CT myelogram.  He then did have a follow-up EMG/nerve conduction study performed on December 11, 2020. There is evidence of a probable multilevel bilateral cervical radiculopathy which revealed less active denervation than prior studies and more chronic neurogenic appearing units.   It was felt due to these changes on electrodiagnostic studies that it was less likely to be a motor neuron disease as there has been no progression. He also did have a bilateral carpal tunnel syndrome. There is no evidence of a demyelinating neuropathy. He was started on gabapentin 600 mg 3 times a day but a follow-up electrodiagnostic study may need to be considered however he had been doing. Since his last visit, he has not noticed any specific weakness but he does feel that his pain is worse. He did take the gabapentin for 1 month but did not feel that it was helping him and he stopped. The pain is worse in his left fingers and hand. He does notice some mild pain in his left upper extremity. He denies any difficulty with breathing, swallowing, or chewing. He is also noticing severe pain in his low back that has some radiation associated with it. He is noticing in regards to his neck that there is some pain with rotation to the right and does feel that he is concerned about his instrumentation being in place. He does not feel there is any specific new weakness. Past Medical History:   Diagnosis Date    Arrhythmia     Depression     GERD (gastroesophageal reflux disease)     Ill-defined condition     possible Joce [place] dx    Neurological disorder         Past Surgical History:   Procedure Laterality Date    HX APPENDECTOMY      NEUROLOGICAL PROCEDURE UNLISTED      rods/screws in neck        Family History   Problem Relation Age of Onset    No Known Problems Mother         Social History     Tobacco Use    Smoking status: Current Every Day Smoker     Packs/day: 0.50    Smokeless tobacco: Never Used   Substance Use Topics    Alcohol use: Not Currently        No Known Allergies     Prior to Admission medications    Medication Sig Start Date End Date Taking?  Authorizing Provider   busPIRone (BUSPAR) 15 mg tablet  5/4/21  Yes Provider, Historical   pregabalin (LYRICA) 100 mg capsule Take 1 Cap by mouth two (2) times a day. Max Daily Amount: 200 mg. 5/5/21  Yes Gene Lyles DO   pantoprazole (Protonix) 40 mg tablet Take 40 mg by mouth daily. Yes Provider, Historical   acetaminophen (Tylenol Extra Strength) 500 mg tablet Take 1,000 mg by mouth every six (6) hours as needed for Pain. Yes Provider, Historical   buPROPion XL (WELLBUTRIN XL) 300 mg XL tablet Take 300 mg by mouth every morning. Yes Provider, Historical   escitalopram oxalate (LEXAPRO) 20 mg tablet Take 20 mg by mouth daily. Yes Provider, Historical   simethicone (GAS-X) 125 mg capsule Take 125 mg by mouth daily. Yes Provider, Historical   emtricitabine-tenofovir, TDF, (Truvada) 200-300 mg per tablet Take  by mouth daily. Yes Provider, Historical   hydrOXYzine HCL (ATARAX) 25 mg tablet Take  by mouth daily. Yes Provider, Historical   methocarbamoL (ROBAXIN) 750 mg tablet Take  by mouth daily. Yes Provider, Historical   gabapentin (NEURONTIN) 300 mg capsule Take 1 Cap by mouth three (3) times daily. Max Daily Amount: 900 mg. 7/21/20   Gene Lyles DO       Review of Systems:    General, constitutional: negative  Eyes, vision: negative  Ears, nose, throat: negative  Cardiovascular, heart: negative  Respiratory: negative  Gastrointestinal: negative  Genitourinary: negative  Musculoskeletal: Pain  Skin and integumentary: negative  Psychiatric: Frustration with overall health  Endocrine: negative  Neurological: negative, except for HPI  Hematologic/lymphatic: negative  Allergy/immunology: negative      Objective: There were no vitals taken for this visit. No vital signs were obtained via telemedicine today. There are limitations to the neurological examination due to the technological features of telemedicine  Physical Exam:    General:  appears well nourished in no acute distress  Respiratory:  good respiratory effort. No labored breathing  Skin: intact.  No obvious erythematous rashes    Neurological exam:    Awake, alert, oriented to person, place and time  Recent and remote memory were normal  Attention and concentration were intact  Language was intact. There was no aphasia  Speech: no dysarthria  Fund of knowledge was preserved    Cranial nerves:     Visual fields were full  Eomi, no evidence of nystagmus  Facial motor: normal and symmetric  Hearing intact    Tongue: midline without fasciculations    Motor:   No evidence of fasciculations    Strength testing:  He still does have persistent weakness distally in his bilateral upper extremities. He is a 4 out of 5 with wrist flexion and extension and weaker with hand intrinsics being significantly weaker with atrophy and contractures setting in. There is only mild weakness on the left. Sensory:  Formal testing could not be performed but he does complain of some pain in his distal right upper extremity    Reflexes:  Unable to obtain via telemedicine    Cerebellar testing:  no tremor apparent, finger/nose and hannah were intact    Romberg: absent    Gait: steady. Labs:     Lab Results   Component Value Date/Time    Sodium 140 07/15/2020 11:40 AM    Potassium 4.6 07/15/2020 11:40 AM    Chloride 105 07/15/2020 11:40 AM    Glucose 120 (H) 07/15/2020 11:40 AM    BUN 13 07/15/2020 11:40 AM    Creatinine 1.13 07/15/2020 11:40 AM    Calcium 9.3 07/15/2020 11:40 AM    WBC 6.3 07/15/2020 11:40 AM    HCT 47.9 07/15/2020 11:40 AM    HGB 15.9 07/15/2020 11:40 AM    PLATELET 933 79/75/4831 11:40 AM       Imaging:    No results found for this or any previous visit. Results from [place] encounter on 07/15/20   CT HEAD WO CONT    Narrative EXAM: CT HEAD WITHOUT CONTRAST    CLINICAL INDICATION/HISTORY: asymmetric weakness. needs before lumbar puncture  -Additional: There is reports asymmetric weakness beginning April 2019, history  of cervical spine surgery for disc disease related to spinal stenosis,  progression of weakness in both hands, question ALS.     COMPARISON: None    TECHNIQUE: Serial axial images of the head were performed without intravenous  contrast. Dose reduction techniques:  Automated exposure control, mAs and/or kVp  reductions based on patient size, and iterative reconstruction. The specific  techniques utilized on this CT exam have been documented in the patient's  electronic medical record. Digital Imaging [employer] Communications in Medicine  (DICOM) format image data are available to nonaffiliated external healthcare  facilities or entities on a secure, media free, reciprocally searchable basis  with patient authorization for at least a 12-month period after this study. _______________    FINDINGS:    BRAIN:     > Intraparenchymal hemorrhage: None.     > Mass effect/edema: None.     > Infarct/encephalomalacia: No evidence of acute cortical ischemia.    > White matter: Unremarkable.     > Brain volume: Normal for age. EXTRA-AXIAL SPACES:     > No extra-axial hemorrhage.     > No hydrocephalus. SINUSES/MASTOIDS: Clear. CALVARIA: Intact. OTHER: Asymmetric soft tissue near the vertex (sagittal image 19), possible soft  tissue contusion. _______________      Impression IMPRESSION:    No acute intracranial findings. Please note MRI would be best able evaluate if  there is concern for ALS. Assessment and Plan:    The patient is a pleasant 50year-old gentleman who has severe weakness in his hands bilaterally right greater than left. His neurological examination does reveal atrophy and weakness in his bilateral upper extremities right greater than left. He does not feel that his weakness is any greater but he does feel that the pain is worse. Bilateral upper extremity weakness (right> left)  The differential for this does include a motor neuron disease versus residual anterior horn cell and nerve root dysfunction associated with severe cervical Spine disease status post surgery.   I did discuss with him that his follow-up EMG/nerve conduction study does not reveal any worsening of the active denervation but more chronic neurogenic appearing motor unit suggestive this being on cervical spine mediated with mild iron degeneration. I do think this does need to be followed closely clinically and may need to consider follow-up electrodiagnostic studies as motor neuron disease is still in the differential.    Given his concern over worsening of his neck pain, I did ask him to schedule a follow-up with Dr. Dajuan Cotton in regards to the hardware. Neuropathic pain:  He did not feel as if this medication was helping and he stopped the medication. Will start lyrica 100 mg twice a day. He will follow up with his pcp who was considering adding muscle relaxant    Lumbar pain:  Given the severity of his prior cervical spine disease, I will order a lumbar spine mri. There is no problem list on file for this patient. The patient should return to clinic in 6 months    Renewed medication;  Gabapentin. Side effects and toxicity were reviewed    I spent  30  minutes on the day of the encounter preparing the office visit by reviewing medical records, obtaining a history, performing examination, counseling and educating the patient and family members on diagnosis, ordering medications and tests, documenting in the clinical medical record, and coordinating the care for the patient. The patient had the ability to ask questions and all questions were answered.           Signed By:  Lauren Craig DO FAAN    May 5, 2021

## 2021-05-05 ENCOUNTER — VIRTUAL VISIT (OUTPATIENT)
Dept: NEUROLOGY | Age: 56
End: 2021-05-05
Payer: MEDICAID

## 2021-05-05 DIAGNOSIS — M54.12 CERVICAL RADICULOPATHY: Primary | ICD-10-CM

## 2021-05-05 DIAGNOSIS — G56.03 BILATERAL CARPAL TUNNEL SYNDROME: ICD-10-CM

## 2021-05-05 DIAGNOSIS — M54.16 LUMBAR RADICULOPATHY: ICD-10-CM

## 2021-05-05 PROCEDURE — 99214 OFFICE O/P EST MOD 30 MIN: CPT | Performed by: PSYCHIATRY & NEUROLOGY

## 2021-05-05 RX ORDER — PREGABALIN 100 MG/1
100 CAPSULE ORAL 2 TIMES DAILY
Qty: 60 CAP | Refills: 5 | Status: SHIPPED | OUTPATIENT
Start: 2021-05-05 | End: 2021-07-03

## 2021-05-05 RX ORDER — BUSPIRONE HYDROCHLORIDE 15 MG/1
TABLET ORAL
COMMUNITY
Start: 2021-05-04

## 2021-06-28 ENCOUNTER — TELEPHONE (OUTPATIENT)
Dept: NEUROLOGY | Age: 56
End: 2021-06-28

## 2021-07-03 DIAGNOSIS — M54.12 CERVICAL RADICULOPATHY: ICD-10-CM

## 2021-07-03 RX ORDER — PREGABALIN 100 MG/1
100 CAPSULE ORAL 3 TIMES DAILY
Qty: 90 CAPSULE | Refills: 5 | Status: SHIPPED | OUTPATIENT
Start: 2021-07-03 | End: 2021-07-20 | Stop reason: SDUPTHER

## 2021-07-19 ENCOUNTER — TELEPHONE (OUTPATIENT)
Dept: NEUROLOGY | Age: 56
End: 2021-07-19

## 2021-07-20 DIAGNOSIS — M54.12 CERVICAL RADICULOPATHY: ICD-10-CM

## 2021-07-20 RX ORDER — PREGABALIN 100 MG/1
100 CAPSULE ORAL
Qty: 90 CAPSULE | Refills: 5 | Status: SHIPPED | OUTPATIENT
Start: 2021-07-20 | End: 2021-08-19

## 2021-07-21 ENCOUNTER — TRANSCRIBE ORDER (OUTPATIENT)
Dept: INTERNAL MEDICINE CLINIC | Age: 56
End: 2021-07-21

## 2021-07-22 ENCOUNTER — TRANSCRIBE ORDER (OUTPATIENT)
Dept: NEUROLOGY | Age: 56
End: 2021-07-22

## 2021-08-18 ENCOUNTER — TELEPHONE (OUTPATIENT)
Dept: NEUROLOGY | Age: 56
End: 2021-08-18

## 2021-08-19 DIAGNOSIS — M54.12 CERVICAL RADICULOPATHY: ICD-10-CM

## 2021-08-19 RX ORDER — PREGABALIN 100 MG/1
100 CAPSULE ORAL 3 TIMES DAILY
Qty: 90 CAPSULE | Refills: 5 | Status: SHIPPED | OUTPATIENT
Start: 2021-08-19 | End: 2021-08-20

## 2021-08-19 NOTE — PROGRESS NOTES
Neurology Note    Patient ID:  Trang Jovel  [id number]  56 y.o.  1965      Date of Consultation:  August 20, 2021      This is a telemedicine visit that was performed with in the originating site at patient's home and the distance site at [facility] outpatient clinic at [facility].  This telemedicine visit utilized synchronous (real-time) audio-video technology. Verbal consent to participate in the video visit was obtained. This visit occurred during the corona (COVID -19) public health emergency. I discussed with the patient the nature of our telemedicine visit, that:  - I would evaluate the patient and recommend diagnostic and treatment based on my assessment  - Our sessions are not being recorded and that personal health information is protected  - Our team will provide follow-up care in person if and when the patient needs it. Consent:  The patient is aware that this patient-initiated Telehealth encounter is a billable service, with coverage as determined by the patient's insurance carrier. The patient is aware that they may receive a bill and has provided verbal consent to proceed:     Subjective: I have increasing pain. History of Present Illness:   Trang Jovel is a 50 y.o. male who returns to the neurology clinic at [facility] for an urgent evaluation. The patient was last seen in the clinic May 5, 2021. Please see my history of present illness, examination, and treatment base plan from that day. He had a history of severe cervical spine disease and had an anterior cervical discectomy with fusion for severe bilateral neuroforaminal stenosis based on CT and CT myelogram.  He then did have a follow-up EMG/nerve conduction study performed on December 11, 2020. There is evidence of a probable multilevel bilateral cervical radiculopathy which revealed less active denervation than prior studies and more chronic neurogenic appearing units.   It was felt due to these changes on electrodiagnostic studies that it was less likely to be a motor neuron disease as there has been no progression. He also did have a bilateral carpal tunnel syndrome. There is no evidence of a demyelinating neuropathy. He was initially taking gabapentin but found that to be ineffective and was switched to Lyrica. He was initially started on 100 mg twice a day. It appears there was some confusion with his dosing and communication with nursing in front office staff. The patient increased his medication to 200 mg twice a day. He then called him when he made this change on his own but appears the message left confusion. He been spoke to Dr. Dean Flores when he was on call and the dose was then increased to 1/2 mg 3 times a day. The patient states he did not run out of medication early as sometimes he had missed doses. He does feel that the pain is getting worse in his arm and in his neck. There is no new weakness. He did have acute appointment scheduled with Dr. Margarito Verma but did not attend his appointments due to a couple different issues. He denies any bowel or bladder difficulties. No new weakness. Past Medical History:   Diagnosis Date    Arrhythmia     Depression     GERD (gastroesophageal reflux disease)     Ill-defined condition     possible Marleta Colla dx    Neurological disorder         Past Surgical History:   Procedure Laterality Date    HX APPENDECTOMY      NEUROLOGICAL PROCEDURE UNLISTED      rods/screws in neck        Family History   Problem Relation Age of Onset    No Known Problems Mother         Social History     Tobacco Use    Smoking status: Current Every Day Smoker     Packs/day: 0.50    Smokeless tobacco: Never Used   Substance Use Topics    Alcohol use: Not Currently        No Known Allergies     Prior to Admission medications    Medication Sig Start Date End Date Taking?  Authorizing Provider   pregabalin (LYRICA) 100 mg capsule Take 1 Capsule by mouth three (3) times daily. Max Daily Amount: 300 mg. 8/19/21  Yes Gene Lyles DO   busPIRone (BUSPAR) 15 mg tablet  5/4/21  Yes Provider, Historical   pantoprazole (Protonix) 40 mg tablet Take 40 mg by mouth daily. Yes Provider, Historical   acetaminophen (Tylenol Extra Strength) 500 mg tablet Take 1,000 mg by mouth every six (6) hours as needed for Pain. Yes Provider, Historical   buPROPion XL (WELLBUTRIN XL) 300 mg XL tablet Take 300 mg by mouth every morning. Yes Provider, Historical   escitalopram oxalate (LEXAPRO) 20 mg tablet Take 20 mg by mouth daily. Yes Provider, Historical   simethicone (GAS-X) 125 mg capsule Take 125 mg by mouth daily. Yes Provider, Historical   emtricitabine-tenofovir, TDF, (Truvada) 200-300 mg per tablet Take  by mouth daily. Yes Provider, Historical   hydrOXYzine HCL (ATARAX) 25 mg tablet Take  by mouth daily. Yes Provider, Historical   methocarbamoL (ROBAXIN) 750 mg tablet Take  by mouth daily. Yes Provider, Historical       Review of Systems:    General, constitutional: negative  Eyes, vision: negative  Ears, nose, throat: negative  Cardiovascular, heart: negative  Respiratory: negative  Gastrointestinal: negative  Genitourinary: negative  Musculoskeletal: Pain  Skin and integumentary: negative  Psychiatric: Frustration with overall health  Endocrine: negative  Neurological: negative, except for HPI  Hematologic/lymphatic: negative  Allergy/immunology: negative      Objective: There were no vitals taken for this visit. No vital signs were obtained via telemedicine today. There are limitations to the neurological examination due to the technological features of telemedicine  Physical Exam:    General:  appears well nourished in no acute distress  Respiratory:  good respiratory effort. No labored breathing  Skin: intact.  No obvious erythematous rashes    Neurological exam:    Awake, alert, oriented to person, place and time  Recent and remote memory were normal  Attention and concentration were intact  Language was intact. There was no aphasia  Speech: no dysarthria  Fund of knowledge was preserved    Cranial nerves:     Visual fields were full  Eomi, no evidence of nystagmus  Facial motor: normal and symmetric  Hearing intact    Tongue: midline without fasciculations    Motor:   No evidence of fasciculations    Strength testing:  He still does have persistent weakness distally in his bilateral upper extremities. He is a 4 out of 5 with wrist flexion and extension and weaker with hand intrinsics being significantly weaker with atrophy and contractures setting in. There is only mild weakness on the left. Sensory:  Formal testing could not be performed but he does complain of some pain in his distal right upper extremity    Reflexes:  Unable to obtain via telemedicine    Cerebellar testing:  no tremor apparent, finger/nose and hannah were intact      Labs:     Lab Results   Component Value Date/Time    Sodium 140 07/15/2020 11:40 AM    Potassium 4.6 07/15/2020 11:40 AM    Chloride 105 07/15/2020 11:40 AM    Glucose 120 (H) 07/15/2020 11:40 AM    BUN 13 07/15/2020 11:40 AM    Creatinine 1.13 07/15/2020 11:40 AM    Calcium 9.3 07/15/2020 11:40 AM    WBC 6.3 07/15/2020 11:40 AM    HCT 47.9 07/15/2020 11:40 AM    HGB 15.9 07/15/2020 11:40 AM    PLATELET 577 93/16/0649 11:40 AM       Imaging:    No results found for this or any previous visit. Results from [place] encounter on 07/15/20    CT HEAD WO CONT    Narrative  EXAM: CT HEAD WITHOUT CONTRAST    CLINICAL INDICATION/HISTORY: asymmetric weakness. needs before lumbar puncture  -Additional: There is reports asymmetric weakness beginning April 2019, history  of cervical spine surgery for disc disease related to spinal stenosis,  progression of weakness in both hands, question ALS.     COMPARISON: None    TECHNIQUE: Serial axial images of the head were performed without intravenous  contrast. Dose reduction techniques:  Automated exposure control, mAs and/or kVp  reductions based on patient size, and iterative reconstruction. The specific  techniques utilized on this CT exam have been documented in the patient's  electronic medical record. Digital Imaging and Communications in Medicine  (DICOM) format image data are available to nonaffiliated external healthcare  facilities or entities on a secure, media free, reciprocally searchable basis  with patient authorization for at least a 12-month period after this study. _______________    FINDINGS:    BRAIN:  > Intraparenchymal hemorrhage: None.  > Mass effect/edema: None.  > Infarct/encephalomalacia: No evidence of acute cortical ischemia.  > White matter: Unremarkable.  > Brain volume: Normal for age. EXTRA-AXIAL SPACES:  > No extra-axial hemorrhage.  > No hydrocephalus. SINUSES/MASTOIDS: Clear. CALVARIA: Intact. OTHER: Asymmetric soft tissue near the vertex (sagittal image 19), possible soft  tissue contusion. _______________    Impression  IMPRESSION:    No acute intracranial findings. Please note MRI would be best able evaluate if  there is concern for ALS. Assessment and Plan:    The patient is a pleasant 50year-old gentleman who has severe weakness in his hands bilaterally right greater than left. His neurological examination does reveal atrophy and weakness in his bilateral upper extremities right greater than left. He does not feel that his weakness is any greater but he does feel that the pain is worse. Bilateral upper extremity weakness (right> left)  The differential for this does include a motor neuron disease versus residual anterior horn cell and nerve root dysfunction associated with severe cervical Spine disease status post surgery.     I did discuss with him that his follow-up EMG/nerve conduction study does not reveal any worsening of the active denervation but more chronic neurogenic appearing motor unit suggestive this being on cervical spine mediated with mild iron degeneration. We will get a follow-up EMG performed in December 2021 as motor neuron disease is in the differential.    He will continue to attempt to schedule a follow-up appoint with Dr. Mikel Parry in regards to the hardware from his cervical spine surgery. Neuropathic pain:  We discussed this at length today. We discussed all of the discrepancies from messages. I did discuss with him that he should not be changing doses of medication without discussing with the physician first.  I will write a prescription for Lyrica 200 mg twice a day as this is helping him. There will not be any further adjustments. He did acknowledge that he will take the medication 200 mg twice a day. An updated prescription was sent to his pharmacy. There is no problem list on file for this patient. The patient should return to clinic in 6 months    Renewed medication;  lyrica  Side effects and toxicity were reviewed    I spent  38  minutes on the day of the encounter preparing the office visit by reviewing medical records, obtaining a history, performing examination, counseling and educating the patient  on diagnosis, ordering medications, documenting in the clinical medical record, and coordinating the care for the patient. The patient had the ability to ask questions and all questions were answered.           Signed By:  Maggi Paul DO FAAN    August 20, 2021

## 2021-08-20 ENCOUNTER — VIRTUAL VISIT (OUTPATIENT)
Dept: NEUROLOGY | Age: 56
End: 2021-08-20
Payer: MEDICAID

## 2021-08-20 DIAGNOSIS — M54.12 CERVICAL RADICULOPATHY: ICD-10-CM

## 2021-08-20 DIAGNOSIS — G56.01 CARPAL TUNNEL SYNDROME OF RIGHT WRIST: Primary | ICD-10-CM

## 2021-08-20 PROCEDURE — 99214 OFFICE O/P EST MOD 30 MIN: CPT | Performed by: PSYCHIATRY & NEUROLOGY

## 2021-08-20 RX ORDER — PREGABALIN 100 MG/1
200 CAPSULE ORAL 2 TIMES DAILY
Qty: 120 CAPSULE | Refills: 5 | Status: SHIPPED | OUTPATIENT
Start: 2021-08-20 | End: 2022-03-14

## 2022-07-15 ENCOUNTER — TELEPHONE (OUTPATIENT)
Dept: NEUROLOGY | Age: 57
End: 2022-07-15

## 2022-07-15 DIAGNOSIS — G60.9 IDIOPATHIC PERIPHERAL NEUROPATHY: ICD-10-CM

## 2022-07-15 DIAGNOSIS — G12.20 MOTOR NEURON DISEASE (HCC): ICD-10-CM

## 2022-07-15 DIAGNOSIS — G56.01 CARPAL TUNNEL SYNDROME OF RIGHT WRIST: ICD-10-CM

## 2022-07-15 DIAGNOSIS — G56.03 BILATERAL CARPAL TUNNEL SYNDROME: ICD-10-CM

## 2022-07-15 DIAGNOSIS — M54.16 LUMBAR RADICULOPATHY: ICD-10-CM

## 2022-07-15 DIAGNOSIS — M54.12 CERVICAL RADICULOPATHY: Primary | ICD-10-CM

## 2022-07-15 NOTE — TELEPHONE ENCOUNTER
Spoke with patient. Verified name/. Patient lives in [place] stated it is hard to travel to our office. He stated he needs a referral for [facility] at [facility] in [place]. Stated Dr. Francisco Meyers is out of the office next week. Notified I will get this over to another provider to write this referral. Patient verbalized understanding.

## 2022-07-15 NOTE — TELEPHONE ENCOUNTER
Patients number has been updated, patient also asking if he can receive a call back from the nurse and if the referral can be sent asap as he is getting worse, mentioned not having had a emg in over a year and possible als

## 2022-07-15 NOTE — TELEPHONE ENCOUNTER
Patient is requesting a referral to be sent to [place] [facility] in [facility] please fax [phone]

## 2022-07-15 NOTE — PROGRESS NOTES
Referral to [facility] neurology has been generated for the patient as he is requesting referral to local neurology due to logistics    Dr. Holly Francis is out of town and I have made the referral

## 2022-07-18 ENCOUNTER — TELEPHONE (OUTPATIENT)
Dept: NEUROLOGY | Age: 57
End: 2022-07-18

## 2022-07-18 NOTE — TELEPHONE ENCOUNTER
VOICEMAIL    Pt states [facility] has not received the referral or notes.  Pt left fax # of [phone]

## 2022-07-23 DIAGNOSIS — M54.12 CERVICAL RADICULOPATHY: ICD-10-CM

## 2022-07-25 RX ORDER — PREGABALIN 100 MG/1
200 CAPSULE ORAL 2 TIMES DAILY
Qty: 120 CAPSULE | Refills: 0 | Status: SHIPPED | OUTPATIENT
Start: 2022-07-25

## 2022-07-25 NOTE — TELEPHONE ENCOUNTER
Requested Prescriptions     Pending Prescriptions Disp Refills    pregabalin (LYRICA) 100 mg capsule 120 Capsule 0       Last office visit: 11/9/2021    Last refill: 3/14/2022    Patient lives in [address] [place] [address], has gotten a referral for neurology at [place]. He is working on getting appointment, he is wondering if you could fill for 3 months with 0 refills until he can get an appointment.

## 2022-07-28 ENCOUNTER — TELEPHONE (OUTPATIENT)
Dept: NEUROLOGY | Age: 57
End: 2022-07-28

## 2022-07-28 NOTE — TELEPHONE ENCOUNTER
Pt called to ask about what was decided about a longer refill. States he will not be able to get an appt with [facility] for months. Refill request from 7/23 asked for a 90 day supply to hold him over. We refilled for a 30 day supply. Can we authorize more refills. Please call to discuss.  [phone]

## 2022-07-28 NOTE — TELEPHONE ENCOUNTER
Spoke with patient. Verified name/. Notified patient of Dr. Felipe Poag message. I have scheduled patient for a virtual visit until he is able to get an appointment with his new neurologist. Patient verbalized understanding.

## 2023-05-20 RX ORDER — HYDROXYZINE HYDROCHLORIDE 25 MG/1
TABLET, FILM COATED ORAL DAILY
COMMUNITY

## 2023-05-20 RX ORDER — SIMETHICONE 125 MG
125 CAPSULE ORAL DAILY
COMMUNITY

## 2023-05-20 RX ORDER — PREGABALIN 100 MG/1
200 CAPSULE ORAL 2 TIMES DAILY
COMMUNITY
Start: 2022-07-25

## 2023-05-20 RX ORDER — ACETAMINOPHEN 500 MG
1000 TABLET ORAL EVERY 6 HOURS PRN
COMMUNITY

## 2023-05-20 RX ORDER — METHOCARBAMOL 750 MG/1
TABLET, FILM COATED ORAL DAILY
COMMUNITY

## 2023-05-20 RX ORDER — ESCITALOPRAM OXALATE 20 MG/1
20 TABLET ORAL DAILY
COMMUNITY

## 2023-05-20 RX ORDER — PANTOPRAZOLE SODIUM 40 MG/1
40 TABLET, DELAYED RELEASE ORAL DAILY
COMMUNITY

## 2023-05-20 RX ORDER — BUPROPION HYDROCHLORIDE 300 MG/1
300 TABLET ORAL EVERY MORNING
COMMUNITY

## 2023-05-20 RX ORDER — BUSPIRONE HYDROCHLORIDE 15 MG/1
TABLET ORAL
COMMUNITY
Start: 2021-05-04

## 2023-05-20 RX ORDER — EMTRICITABINE AND TENOFOVIR DISOPROXIL FUMARATE 200; 300 MG/1; MG/1
TABLET, FILM COATED ORAL DAILY
COMMUNITY